# Patient Record
Sex: MALE | Race: WHITE
[De-identification: names, ages, dates, MRNs, and addresses within clinical notes are randomized per-mention and may not be internally consistent; named-entity substitution may affect disease eponyms.]

---

## 2022-08-02 ENCOUNTER — HOSPITAL ENCOUNTER (OUTPATIENT)
Dept: HOSPITAL 47 - ORWHC2ENDO | Age: 72
Discharge: HOME | End: 2022-08-02
Attending: INTERNAL MEDICINE
Payer: MEDICARE

## 2022-08-02 VITALS — TEMPERATURE: 97 F

## 2022-08-02 VITALS — BODY MASS INDEX: 21.2 KG/M2

## 2022-08-02 VITALS — HEART RATE: 63 BPM | SYSTOLIC BLOOD PRESSURE: 160 MMHG | DIASTOLIC BLOOD PRESSURE: 71 MMHG | RESPIRATION RATE: 18 BRPM

## 2022-08-02 DIAGNOSIS — I71.2: ICD-10-CM

## 2022-08-02 DIAGNOSIS — Z12.11: Primary | ICD-10-CM

## 2022-08-02 DIAGNOSIS — K64.1: ICD-10-CM

## 2022-08-02 DIAGNOSIS — M19.90: ICD-10-CM

## 2022-08-02 DIAGNOSIS — Z79.82: ICD-10-CM

## 2022-08-02 DIAGNOSIS — Z90.49: ICD-10-CM

## 2022-08-02 DIAGNOSIS — Z79.899: ICD-10-CM

## 2022-08-02 DIAGNOSIS — K57.30: ICD-10-CM

## 2022-08-02 PROCEDURE — 45378 DIAGNOSTIC COLONOSCOPY: CPT

## 2022-08-02 RX ADMIN — POTASSIUM CHLORIDE SCH MLS: 14.9 INJECTION, SOLUTION INTRAVENOUS at 11:49

## 2022-08-02 RX ADMIN — POTASSIUM CHLORIDE SCH MLS: 14.9 INJECTION, SOLUTION INTRAVENOUS at 11:05

## 2022-08-02 NOTE — P.PCN
Date of Procedure: 08/02/22


Procedure(s) Performed: 


BRIEF HISTORY: Patient is a 71-year-old pleasant white male scheduled for an 

elective colonoscopy as a part of screening for colorectal neoplasia





PROCEDURE PERFORMED: Colonoscopy. 





PREOPERATIVE DIAGNOSIS: Screening for colon cancer. 





IV sedation per Anesthesia. 





PROCEDURE: After informed consent was obtained, the patient, was brought into 

the endoscopy unit. IV sedation was administered by Anesthesia under continuous 

monitoring.  Digital rectal examination was normal. Initially the Olympus CF-160

flexible video colonoscope was then inserted in the rectum, gradually advanced 

into the cecum without any difficulty. Careful examination was performed as the 

scope was gradually being withdrawn. Ileocecal valve and the appendiceal orifice

were visualized and appeared normal.  Prep was excellent. Mucosa of the cecum, 

ascending colon, transverse colon, descending colon, sigmoid colon, and rectum 

appeared normal.  Scattered sigmoid diverticulosis.  Retroflexion was performed 

in the rectum and grade 2 internal hemorrhoids were seen. The patient tolerated 

the procedure well. 





IMPRESSION: 


Normal-appearing colon from rectum to cecum with no evidence of colorectal 

neoplasia .


Grade 2 internal hemorrhoids


Scattered sigmoid diverticulosis





RECOMMENDATIONS:  Findings of this examination were discussed with the patient 

as well as his family.  He was advised to have a repeat screening colonoscopy in

10 years..